# Patient Record
Sex: MALE | Race: WHITE | NOT HISPANIC OR LATINO | URBAN - METROPOLITAN AREA
[De-identification: names, ages, dates, MRNs, and addresses within clinical notes are randomized per-mention and may not be internally consistent; named-entity substitution may affect disease eponyms.]

---

## 2017-11-17 ENCOUNTER — HOSPITAL ENCOUNTER (OUTPATIENT)
Dept: FAMILY MEDICINE CLINIC | Facility: CLINIC | Age: 32
Setting detail: SPECIMEN
Discharge: HOME | End: 2017-11-17
Attending: NURSE PRACTITIONER | Admitting: NURSE PRACTITIONER

## 2017-11-17 ENCOUNTER — CONVERSION ENCOUNTER (OUTPATIENT)
Dept: FAMILY MEDICINE CLINIC | Facility: CLINIC | Age: 32
End: 2017-11-17

## 2017-11-17 LAB
ALBUMIN SERPL-MCNC: 4.1 G/DL (ref 3.5–4.8)
ALBUMIN/GLOB SERPL: 1.3 {RATIO} (ref 1–1.7)
ALP SERPL-CCNC: 56 IU/L (ref 32–91)
ALT SERPL-CCNC: 34 IU/L (ref 17–63)
ANION GAP SERPL CALC-SCNC: 13.8 MMOL/L (ref 10–20)
AST SERPL-CCNC: 32 IU/L (ref 15–41)
BILIRUB SERPL-MCNC: 1.5 MG/DL (ref 0.3–1.2)
BUN SERPL-MCNC: 13 MG/DL (ref 8–20)
BUN/CREAT SERPL: 16.3 (ref 6.2–20.3)
CALCIUM SERPL-MCNC: 9.2 MG/DL (ref 8.9–10.3)
CHLORIDE SERPL-SCNC: 100 MMOL/L (ref 101–111)
CHOLEST SERPL-MCNC: 190 MG/DL
CHOLEST/HDLC SERPL: 4.5 {RATIO}
CONV CO2: 24 MMOL/L (ref 22–32)
CONV LDL CHOLESTEROL DIRECT: 110 MG/DL (ref 0–100)
CONV TOTAL PROTEIN: 7.3 G/DL (ref 6.1–7.9)
CREAT UR-MCNC: 0.8 MG/DL (ref 0.7–1.2)
GLOBULIN UR ELPH-MCNC: 3.2 G/DL (ref 2.5–3.8)
GLUCOSE SERPL-MCNC: 143 MG/DL (ref 65–99)
HDLC SERPL-MCNC: 42 MG/DL
LDLC/HDLC SERPL: 2.6 {RATIO}
LIPID INTERPRETATION: ABNORMAL
POTASSIUM SERPL-SCNC: 3.8 MMOL/L (ref 3.6–5.1)
SODIUM SERPL-SCNC: 134 MMOL/L (ref 136–144)
TRIGL SERPL-MCNC: 293 MG/DL
VLDLC SERPL CALC-MCNC: 38.6 MG/DL

## 2017-12-02 ENCOUNTER — HOSPITAL ENCOUNTER (OUTPATIENT)
Dept: SLEEP MEDICINE | Facility: HOSPITAL | Age: 32
Discharge: HOME | End: 2017-12-02
Attending: INTERNAL MEDICINE | Admitting: INTERNAL MEDICINE

## 2018-02-19 ENCOUNTER — HOSPITAL ENCOUNTER (OUTPATIENT)
Dept: FAMILY MEDICINE CLINIC | Facility: CLINIC | Age: 33
Setting detail: SPECIMEN
Discharge: HOME | End: 2018-02-19
Attending: NURSE PRACTITIONER | Admitting: NURSE PRACTITIONER

## 2018-02-19 LAB
ANION GAP SERPL CALC-SCNC: 13.8 MMOL/L (ref 10–20)
BUN SERPL-MCNC: 9 MG/DL (ref 8–20)
BUN/CREAT SERPL: 10 (ref 6.2–20.3)
CALCIUM SERPL-MCNC: 9.1 MG/DL (ref 8.9–10.3)
CHLORIDE SERPL-SCNC: 100 MMOL/L (ref 101–111)
CHOLEST SERPL-MCNC: 156 MG/DL
CHOLEST/HDLC SERPL: 4.7 {RATIO}
CONV CO2: 26 MMOL/L (ref 22–32)
CONV LDL CHOLESTEROL DIRECT: 81 MG/DL (ref 0–100)
CREAT UR-MCNC: 0.9 MG/DL (ref 0.7–1.2)
GLUCOSE SERPL-MCNC: 165 MG/DL (ref 65–99)
HDLC SERPL-MCNC: 33 MG/DL
LDLC/HDLC SERPL: 2.4 {RATIO}
LIPID INTERPRETATION: ABNORMAL
POTASSIUM SERPL-SCNC: 3.8 MMOL/L (ref 3.6–5.1)
SODIUM SERPL-SCNC: 136 MMOL/L (ref 136–144)
TRIGL SERPL-MCNC: 261 MG/DL
VLDLC SERPL CALC-MCNC: 41.6 MG/DL

## 2018-10-18 ENCOUNTER — HOSPITAL ENCOUNTER (OUTPATIENT)
Dept: FAMILY MEDICINE CLINIC | Facility: CLINIC | Age: 33
Setting detail: SPECIMEN
Discharge: HOME | End: 2018-10-18
Attending: PHYSICIAN ASSISTANT | Admitting: PHYSICIAN ASSISTANT

## 2018-10-18 LAB
ALBUMIN SERPL-MCNC: 3.8 G/DL (ref 3.5–4.8)
ALBUMIN/GLOB SERPL: 1.2 {RATIO} (ref 1–1.7)
ALP SERPL-CCNC: 49 IU/L (ref 32–91)
ALT SERPL-CCNC: 59 IU/L (ref 17–63)
ANION GAP SERPL CALC-SCNC: 15.4 MMOL/L (ref 10–20)
AST SERPL-CCNC: 49 IU/L (ref 15–41)
BILIRUB SERPL-MCNC: 1.1 MG/DL (ref 0.3–1.2)
BUN SERPL-MCNC: 13 MG/DL (ref 8–20)
BUN/CREAT SERPL: 16.3 (ref 6.2–20.3)
CALCIUM SERPL-MCNC: 9.2 MG/DL (ref 8.9–10.3)
CHLORIDE SERPL-SCNC: 96 MMOL/L (ref 101–111)
CHOLEST SERPL-MCNC: 170 MG/DL
CHOLEST/HDLC SERPL: 4.9 {RATIO}
CONV CO2: 28 MMOL/L (ref 22–32)
CONV LDL CHOLESTEROL DIRECT: 57 MG/DL (ref 0–100)
CONV TOTAL PROTEIN: 7 G/DL (ref 6.1–7.9)
CREAT UR-MCNC: 0.8 MG/DL (ref 0.7–1.2)
GLOBULIN UR ELPH-MCNC: 3.2 G/DL (ref 2.5–3.8)
GLUCOSE SERPL-MCNC: 181 MG/DL (ref 65–99)
HBA1C MFR BLD: 7.5 % (ref 0–5.6)
HDLC SERPL-MCNC: 35 MG/DL
LDLC/HDLC SERPL: 1.6 {RATIO}
LIPID INTERPRETATION: ABNORMAL
POTASSIUM SERPL-SCNC: 3.4 MMOL/L (ref 3.6–5.1)
SODIUM SERPL-SCNC: 136 MMOL/L (ref 136–144)
TRIGL SERPL-MCNC: 668 MG/DL
VLDLC SERPL CALC-MCNC: 78.9 MG/DL

## 2019-05-16 ENCOUNTER — HOSPITAL ENCOUNTER (OUTPATIENT)
Dept: FAMILY MEDICINE CLINIC | Facility: CLINIC | Age: 34
Setting detail: SPECIMEN
Discharge: HOME | End: 2019-05-16
Attending: PHYSICIAN ASSISTANT | Admitting: PHYSICIAN ASSISTANT

## 2019-05-16 LAB
ALBUMIN SERPL-MCNC: 4.2 G/DL (ref 3.5–4.8)
ALBUMIN/GLOB SERPL: 1.2 {RATIO} (ref 1–1.7)
ALP SERPL-CCNC: 52 IU/L (ref 32–91)
ALT SERPL-CCNC: 51 IU/L (ref 17–63)
ANION GAP SERPL CALC-SCNC: 17.6 MMOL/L (ref 10–20)
AST SERPL-CCNC: 41 IU/L (ref 15–41)
BILIRUB SERPL-MCNC: 0.9 MG/DL (ref 0.3–1.2)
BUN SERPL-MCNC: 11 MG/DL (ref 8–20)
BUN/CREAT SERPL: 12.2 (ref 6.2–20.3)
CALCIUM SERPL-MCNC: 9.6 MG/DL (ref 8.9–10.3)
CHLORIDE SERPL-SCNC: 99 MMOL/L (ref 101–111)
CHOLEST SERPL-MCNC: 178 MG/DL
CHOLEST/HDLC SERPL: 5 {RATIO}
CONV CO2: 22 MMOL/L (ref 22–32)
CONV LDL CHOLESTEROL DIRECT: 59 MG/DL (ref 0–100)
CONV TOTAL PROTEIN: 7.8 G/DL (ref 6.1–7.9)
CREAT UR-MCNC: 0.9 MG/DL (ref 0.7–1.2)
GLOBULIN UR ELPH-MCNC: 3.6 G/DL (ref 2.5–3.8)
GLUCOSE SERPL-MCNC: 176 MG/DL (ref 65–99)
HDLC SERPL-MCNC: 36 MG/DL
LDLC/HDLC SERPL: 1.6 {RATIO}
LIPID INTERPRETATION: ABNORMAL
POTASSIUM SERPL-SCNC: 3.6 MMOL/L (ref 3.6–5.1)
SODIUM SERPL-SCNC: 135 MMOL/L (ref 136–144)
TRIGL SERPL-MCNC: 826 MG/DL
VLDLC SERPL CALC-MCNC: 83.6 MG/DL

## 2019-05-17 LAB — HBA1C MFR BLD: 8.2 % (ref 0–5.6)

## 2019-05-30 ENCOUNTER — CONVERSION ENCOUNTER (OUTPATIENT)
Dept: FAMILY MEDICINE CLINIC | Facility: CLINIC | Age: 34
End: 2019-05-30

## 2019-06-04 VITALS — SYSTOLIC BLOOD PRESSURE: 155 MMHG | HEART RATE: 86 BPM | DIASTOLIC BLOOD PRESSURE: 106 MMHG

## 2019-06-04 VITALS
OXYGEN SATURATION: 96 % | BODY MASS INDEX: 46.44 KG/M2 | DIASTOLIC BLOOD PRESSURE: 127 MMHG | SYSTOLIC BLOOD PRESSURE: 190 MMHG | HEART RATE: 68 BPM | WEIGHT: 315 LBS

## 2019-06-06 NOTE — PROGRESS NOTES
Visit Type:  Follow-up Visit  Referring Provider:  LYLA Bush  Primary Provider:  Davey ARITA      History of Present Illness:  Pt is a 32 y/o WM here for 2 wk follow up on diabetes and hypertriglyceridemia.  He reports he hasns't started the new meds I prescibed after last visit.  He wanted to discuss it with me.  He reports BPs at home of 190s/100s.      R O S:  pt denies CP, SOA, headaches, visual changes.        Past Medical History:     Reviewed history from 12/08/2017 and no changes required:        Hypertension        DM2        TA    Past Surgical History:     Reviewed history from 04/07/2017 and no changes required:        Ear    Family History Summary:      Reviewed history Last on 05/16/2019 and no changes required:05/30/2019  Mother - Has FH Other Cancer - Entered On: 11/10/2017  Sister - Has FH High Cholesterol - Entered On: 11/10/2017  Sister - Has FH Diabetes - Entered On: 11/10/2017  Father - Has FH Hypertension - Entered On: 11/10/2017  Father - Has FH Diabetes - Entered On: 4/7/2017    General Comments - FH:  FH Diabetes  FH Hypertension  FH cancer    Social History:     Reviewed history from 01/29/2019 and no changes required:        Passive Smoke: Y        Alcohol Use: Y        Drug Use: N        HIV/High Risk: N        Regular Exercise: N                Smoking History:        Patient currently smokes every day.        Patient has been counseled to quit.      Current Allergies (reviewed today):  No known allergies    Current Medications (including medications started today):   OZEMPIC 0.25 OR 0.5 MG/DOSE SUBCUTANEOUS SOLUTION PEN-INJECTOR (SEMAGLUTIDE) 0.25mg qwk x 4 wks then 0.5mg weekly thereafter  VASCEPA 1 GM ORAL CAPSULE (ICOSAPENT ETHYL) 2 grams BID  METFORMIN HCL  MG ORAL TABLET EXTENDED RELEASE 24 HOUR (METFORMIN HCL) Take one (1) tablet by mouth twice a day with meals  CLONIDINE HCL 0.3 MG ORAL TABLET (CLONIDINE HCL) Take one (1) tablet by mouth three times a  day  LISINOPRIL 40 MG ORAL TABLET (LISINOPRIL) Take 1 tablet by mouth daily      Risk Factors:     Smoked Tobacco Use:  Current every day smoker     Cigars:  Yes -- 1 per week  Smokeless Tobacco Use:  Never     Counseled to quit/cut down:  yes  Passive smoke exposure:  yes  Drug use:  no  HIV high-risk behavior:  no  Caffeine use:  <1 drinks per day  Alcohol use:  yes     Type:  Rarely     Drinks per day:  rare  Exercise:  no  Seatbelt use:  100 %  Sun Exposure:  occasionally    Previous Tobacco Use: Signed On 2019  Smoked Tobacco Use:  Current every day smoker     Cigars:  Yes -- 1 per week  Smokeless Tobacco Use:  Never     Counseled to quit/cut down:  yes  Passive smoke exposure:  yes  Drug use:  no  HIV high-risk behavior:  no  Caffeine use:  <1 drinks per day    Previous Alcohol Use: Signed On 2019  Alcohol use:  yes     Type:  Rarely     Drinks per day:  rare  Exercise:  no  Seatbelt use:  100 %  Sun Exposure:  occasionally      Review of Systems        See HPI      Vital Signs:    Patient Profile:    33 Years Old Male  Height:     71 inches (180.34 cm)  Weight:     333 pounds  BMI:        46.44     O2 Sat:     96 %  Pulse rate: 68 / minute  BP Sittin / 127  (right arm)      Medications: Medications were reviewed with the patient during this visit.  Allergies: Allergies were reviewed with the patient during this visit.  No Known Allergy.        Vitals Entered By: oCny Gardner CMA (May 30, 2019 7:39 AM)      Physical Exam    General:      well developed, well nourished, in no acute distress.  obese.    Lungs:      clear bilaterally to auscultation.    Heart:      non-displaced PMI, chest non-tender; regular rate and rhythm, S1, S2 without murmurs, rubs, or gallops  Cervical Nodes:      no significant adenopathy.    Psych:      alert and cooperative; normal mood and affect; normal attention span and concentration.        Blood Pressure:  Today's BP: 190/127 mm Hg    Labwork:   Most Recent  Lab Results:   LDL: 59 mg/dL 05/16/2019  HbA1c: : 8.2 % 05/17/2019        Impression & Recommendations:    Problem # 1:  Hypertriglyceridemia (ICD-272.1) (IWC21-A01.1)  Assessment: Unchanged  Pt to start Vascepa.      His updated medication list for this problem includes:     Vascepa 1 Gm Oral Capsule (Icosapent ethyl) ..... 2 grams bid      Problem # 2:  Diabetes melititus/non insulin dependent (ICD-250.00) (YYF73-H08.9)  Assessment: Unchanged  Pt to start Ozempic, cont Metformin.      His updated medication list for this problem includes:     Ozempic 0.25 or 0.5 Mg/dose Subcutaneous Solution Pen-injector (Semaglutide) ..... 0.25mg qwk x 4 wks then 0.5mg weekly thereafter     Metformin Hcl Er 750 Mg Oral Tablet Extended Release 24 Hour (Metformin hcl) ..... Take one (1) tablet by mouth twice a day with meals     Lisinopril 40 Mg Oral Tablet (Lisinopril) ..... Take 1 tablet by mouth daily      Problem # 3:  Hypertension (ICD-401.9) (MEO65-R53)  Assessment: Deteriorated  Increasing Clonidine to 0.3mg TID, cont Lisinopril.  Stroke symptoms discussed with pt in detail, ER precautions discussed.      His updated medication list for this problem includes:     Clonidine Hcl 0.3 Mg Oral Tablet (Clonidine hcl) ..... Take one (1) tablet by mouth three times a day     Lisinopril 40 Mg Oral Tablet (Lisinopril) ..... Take 1 tablet by mouth daily      Medications Added to Medication List This Visit:  1)  Clonidine Hcl 0.3 Mg Oral Tablet (Clonidine hcl) .... Take one (1) tablet by mouth three times a day      Patient Instructions:  1)  Call in 1 week.    2)  Please schedule a follow-up appointment in 3 months.    3)  Exercise daily.    4)  Follow a whole foods, plant based diet.              Medications:  OZEMPIC 0.25 OR 0.5 MG/DOSE SUBCUTANEOUS SOLUTION PEN-INJECTOR (SEMAGLUTIDE) 0.25mg qwk x 4 wks then 0.5mg weekly thereafter  #2[Prefilled Pen Syrnge] x 5      Entered and Authorized by:  Nevin Mariscal      Electronically  signed by:   Nevin Mariscal on 05/30/2019      Method used:    Electronically to               [id number]* (retail)              [address] [place] [address], [address]              [place], [place]  [id number]              Ph: [phone]              Fax: [phone]      Note to Pharmacy: Route: SUBCUTANEOUS;       RxID:   [id number]  VASCEPA 1 GM ORAL CAPSULE (ICOSAPENT ETHYL) 2 grams BID  #120[Capsule] x 5      Entered and Authorized by:  Nevin Mariscal      Electronically signed by:   Nevin Mariscal on 05/30/2019      Method used:    Electronically to               [id number]* (retail)              [address] [place] [address], [address]              [place], [place]  [id number]              Ph: [phone]              Fax: [phone]      RxID:   [id number]  CLONIDINE HCL 0.3 MG ORAL TABLET (CLONIDINE HCL) Take one (1) tablet by mouth three times a day  #90[Tablet] x 1      Entered and Authorized by:  Nevin Mariscal      Electronically signed by:   Nevin Mariscal on 05/30/2019      Method used:    Electronically to               [id number]* (retail)              [address] [place] [address], [address]              [place], [place]  [id number]              Ph: [phone]              Fax: [phone]      Note to Pharmacy: Route: ORAL;       RxID:   [id number]  OZEMPIC 0.25 OR 0.5 MG/DOSE SUBCUTANEOUS SOLUTION PEN-INJECTOR (SEMAGLUTIDE) 0.25mg qwk x 4 wks then 0.5mg weekly thereafter  #2[Prefilled Pen Syrnge] x 5      Entered and Authorized by:  Nevin Mariscal      Electronically signed by:   Nevin Mariscal on 05/30/2019      Method used:    Print then Give to Patient      RxID:   [id number]  VASCEPA 1 GM ORAL CAPSULE (ICOSAPENT ETHYL) 2 grams BID  #120[Capsule] x 5      Entered and Authorized by:  Nevin Mariscal      Electronically signed by:   Nevin Mariscal on 05/30/2019      Method used:    Print then Give to Patient      RxID:    [id number]  CLONIDINE HCL 0.3 MG ORAL TABLET (CLONIDINE HCL) Take one (1) tablet by mouth three times a day  #90[Tablet] x 1      Entered and Authorized by:  Nevin Mariscal      Electronically signed by:   Nevin Mariscal on 05/30/2019      Method used:    Print then Give to Patient      Note to Pharmacy: Route: ORAL;       RxID:   [id number]                Medication Administration    Orders Added:  1)  Ofc Vst, Est Level IV [66924]  ]      Electronically signed by Nevin Mariscal on 05/30/2019 at 8:07 AM  ________________________________________________________________________       Disclaimer: Converted Note message may not contain all data elements that existed in the [employer] source system. Please see [employer] System for the original note details.

## 2019-06-24 RX ORDER — METFORMIN HYDROCHLORIDE 750 MG/1
TABLET, EXTENDED RELEASE ORAL
Qty: 180 TABLET | Refills: 0 | Status: SHIPPED | OUTPATIENT
Start: 2019-06-24

## 2019-06-29 RX ORDER — CLONIDINE HYDROCHLORIDE 0.2 MG/1
TABLET ORAL
Qty: 60 TABLET | Refills: 0 | Status: SHIPPED | OUTPATIENT
Start: 2019-06-29 | End: 2019-07-18 | Stop reason: SDUPTHER

## 2019-07-14 RX ORDER — LISINOPRIL 40 MG/1
TABLET ORAL
Qty: 90 TABLET | Refills: 0 | Status: SHIPPED | OUTPATIENT
Start: 2019-07-14 | End: 2019-11-24 | Stop reason: SDUPTHER

## 2019-07-18 RX ORDER — CLONIDINE HYDROCHLORIDE 0.2 MG/1
TABLET ORAL
Qty: 60 TABLET | Refills: 0 | Status: SHIPPED | OUTPATIENT
Start: 2019-07-18 | End: 2019-09-16 | Stop reason: SDUPTHER

## 2019-09-16 RX ORDER — CLONIDINE HYDROCHLORIDE 0.2 MG/1
TABLET ORAL
Qty: 60 TABLET | Refills: 0 | Status: SHIPPED | OUTPATIENT
Start: 2019-09-16 | End: 2019-12-09

## 2019-11-25 RX ORDER — LISINOPRIL 40 MG/1
TABLET ORAL
Qty: 90 TABLET | Refills: 0 | Status: SHIPPED | OUTPATIENT
Start: 2019-11-25

## 2019-12-06 ENCOUNTER — TELEPHONE (OUTPATIENT)
Dept: FAMILY MEDICINE CLINIC | Facility: CLINIC | Age: 34
End: 2019-12-06

## 2019-12-06 PROBLEM — I10 HYPERTENSION: Status: ACTIVE | Noted: 2019-12-06

## 2019-12-06 PROBLEM — E66.9 OBESITY: Status: ACTIVE | Noted: 2017-11-08

## 2019-12-06 PROBLEM — E11.9 TYPE 2 DIABETES MELLITUS (HCC): Status: ACTIVE | Noted: 2017-11-18

## 2019-12-06 NOTE — TELEPHONE ENCOUNTER
[facility] will only cover one box of the 2mg/1.5ml pen injectors for a 15 day supply .  After insurance the pt copay is $571.00

## 2019-12-09 RX ORDER — METFORMIN HYDROCHLORIDE 750 MG/1
1 TABLET, EXTENDED RELEASE ORAL DAILY
COMMUNITY
Start: 2017-11-18

## 2019-12-09 RX ORDER — LISINOPRIL 40 MG/1
1 TABLET ORAL EVERY 24 HOURS
COMMUNITY
Start: 2018-06-28 | End: 2019-12-09

## 2019-12-09 RX ORDER — CLONIDINE HYDROCHLORIDE 0.3 MG/1
TABLET ORAL EVERY 8 HOURS
COMMUNITY
Start: 2018-03-13

## 2019-12-09 RX ORDER — ICOSAPENT ETHYL 1000 MG/1
CAPSULE ORAL
COMMUNITY
Start: 2019-05-16

## 2019-12-12 NOTE — TELEPHONE ENCOUNTER
I called the patient and discussed with him the cost of his medication.  He states that he cannot afford the $60 the Ozempic will cost her his mail order pharmacy.  He is willing to try something else instead, for this reason I have sent in victoza.